# Patient Record
Sex: FEMALE | Race: WHITE | Employment: UNEMPLOYED | URBAN - METROPOLITAN AREA
[De-identification: names, ages, dates, MRNs, and addresses within clinical notes are randomized per-mention and may not be internally consistent; named-entity substitution may affect disease eponyms.]

---

## 2023-05-15 ENCOUNTER — APPOINTMENT (OUTPATIENT)
Dept: CT IMAGING | Age: 58
End: 2023-05-15
Payer: COMMERCIAL

## 2023-05-15 ENCOUNTER — HOSPITAL ENCOUNTER (EMERGENCY)
Age: 58
Discharge: HOME | End: 2023-05-16
Attending: EMERGENCY MEDICINE
Payer: COMMERCIAL
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
## 2023-05-15 PROCEDURE — 70486 CT MAXILLOFACIAL W/O DYE: CPT

## 2023-05-15 PROCEDURE — 99284 EMERGENCY DEPT VISIT MOD MDM: CPT

## 2023-05-15 PROCEDURE — 70450 CT HEAD/BRAIN W/O DYE: CPT

## 2023-05-15 RX ORDER — MULTIVIT-MIN/IRON/FOLIC ACID/K 18-600-40
CAPSULE ORAL
COMMUNITY

## 2023-05-15 RX ORDER — ZOLPIDEM TARTRATE 5 MG/1
5 TABLET ORAL NIGHTLY PRN
COMMUNITY

## 2023-05-15 RX ORDER — LANOLIN ALCOHOL/MO/W.PET/CERES
50 CREAM (GRAM) TOPICAL DAILY
COMMUNITY

## 2023-05-15 RX ORDER — MULTIVIT WITH MINERALS/LUTEIN
250 TABLET ORAL DAILY
COMMUNITY

## 2023-05-15 ASSESSMENT — PAIN SCALES - GENERAL: PAINLEVEL_OUTOF10: 3

## 2023-05-15 ASSESSMENT — PAIN - FUNCTIONAL ASSESSMENT: PAIN_FUNCTIONAL_ASSESSMENT: 0-10

## 2023-05-15 ASSESSMENT — PAIN DESCRIPTION - LOCATION: LOCATION: FACE

## 2023-05-16 VITALS
RESPIRATION RATE: 18 BRPM | WEIGHT: 131 LBS | HEART RATE: 68 BPM | DIASTOLIC BLOOD PRESSURE: 81 MMHG | OXYGEN SATURATION: 100 % | SYSTOLIC BLOOD PRESSURE: 134 MMHG

## 2023-05-16 PROCEDURE — 90471 IMMUNIZATION ADMIN: CPT | Performed by: STUDENT IN AN ORGANIZED HEALTH CARE EDUCATION/TRAINING PROGRAM

## 2023-05-16 PROCEDURE — 6360000002 HC RX W HCPCS: Performed by: STUDENT IN AN ORGANIZED HEALTH CARE EDUCATION/TRAINING PROGRAM

## 2023-05-16 PROCEDURE — 90715 TDAP VACCINE 7 YRS/> IM: CPT | Performed by: STUDENT IN AN ORGANIZED HEALTH CARE EDUCATION/TRAINING PROGRAM

## 2023-05-16 RX ADMIN — TETANUS TOXOID, REDUCED DIPHTHERIA TOXOID AND ACELLULAR PERTUSSIS VACCINE, ADSORBED 0.5 ML: 5; 2.5; 8; 8; 2.5 SUSPENSION INTRAMUSCULAR at 00:12

## 2023-05-16 ASSESSMENT — ENCOUNTER SYMPTOMS
EYE PAIN: 0
ALLERGIC/IMMUNOLOGIC NEGATIVE: 1
EYES NEGATIVE: 1
SINUS PRESSURE: 0
RHINORRHEA: 0
GASTROINTESTINAL NEGATIVE: 1
RESPIRATORY NEGATIVE: 1

## 2023-05-16 NOTE — DISCHARGE INSTRUCTIONS
You were seen in the emergency department after a fall    Your CT images did not show any evidence of broken bones in your head or face    You can take Tylenol and Motrin as needed for pain     If you continue to have issues with your teeth you can follow-up with your dentist    Make sure to keep your abrasions clean, you can wash them with soap and water and apply antibiotic ointment

## 2023-05-16 NOTE — ED NOTES
Reviewed discharge instructions, pt verbalized understanding. Denies further needs.      Garfield Parra RN  05/16/23 [id number]

## 2023-05-16 NOTE — ED PROVIDER NOTES
[id number] [facility] RESIDENT NOTE       Date of evaluation: 5/15/2023    Chief Complaint     Fall      History of Present Illness     Karen Hong is a 59 y.o. female who presents to the emergency department after mechanical fall. Patient states she was walking down the stairs and was at a landing and then began to walk too fast and fell down approximately 3-4 stairs. States she feels face forward hitting her face on wooden floors. Denies loss of consciousness. Was able to ambulate after. Does not take blood thinners or antiplatelet medications. Is currently complaining of nose pain as well as numbness of her top right central incisor. Denies any numbness or tingling in her upper or lower extremities. MEDICAL DECISION MAKING / ASSESSMENT / PLAN     INITIAL VITALS: BP: (!) 174/88,  , Pulse: 71, Respirations: 20, SpO2: 100 %    Karen Hong is a 59 y.o. female presenting to the emergency department after a ground-level mechanical fall. Upon presentation she was noted to be afebrile and hemodynamically stable. She was alert and oriented x4 with a GCS of 15. She did not have any neurologic deficits. She did have an abrasion over her upper nasal bridge and upper lip, but no laceration. She did not have any malocclusion or midface tenderness. No evidence of dental trauma. She otherwise did not have any other bony tenderness. Patient's tetanus was updated. CT head as well as CT max face were obtained which did not show any acute traumatic abnormality although her septum was slightly deviated. Bleed mechanical nature, low suspicion for syncope. Recommended Tylenol for pain and follow-up with her primary care doctor return precautions to the emergency department discussed. Is this patient to be included in the SEP-1 core measure due to severe sepsis or septic shock?  No Exclusion criteria - the patient is NOT to be included for SEP-1 Core Measure

## 2023-05-16 NOTE — ED PROVIDER NOTES
ED Attending Attestation Note     Date of evaluation: 5/15/2023    This patient was seen by the resident. I have seen and examined the patient, agree with the workup, evaluation, management and diagnosis. The care plan has been discussed. My assessment reveals a 64-year-old female who presents with a chief complaint of fall. Patient with a mechanical fall down a few stairs, landed on her face. Patient on exam with some dried blood on her nose but no large lacerations. GCS 15. Normal gait. Lavinia Boothe MD  05/16/23 [id number]